# Patient Record
Sex: FEMALE | Race: WHITE | NOT HISPANIC OR LATINO | Employment: STUDENT | URBAN - METROPOLITAN AREA
[De-identification: names, ages, dates, MRNs, and addresses within clinical notes are randomized per-mention and may not be internally consistent; named-entity substitution may affect disease eponyms.]

---
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
## 2022-05-23 ENCOUNTER — ANNUAL EXAM (OUTPATIENT)
Dept: OBGYN CLINIC | Facility: CLINIC | Age: 22
End: 2022-05-23
Payer: COMMERCIAL

## 2022-05-23 VITALS
BODY MASS INDEX: 24.41 KG/M2 | WEIGHT: 143 LBS | HEIGHT: 64 IN | DIASTOLIC BLOOD PRESSURE: 62 MMHG | SYSTOLIC BLOOD PRESSURE: 118 MMHG

## 2022-05-23 DIAGNOSIS — Z01.419 ENCOUNTER FOR ANNUAL ROUTINE GYNECOLOGICAL EXAMINATION: Primary | ICD-10-CM

## 2022-05-23 DIAGNOSIS — N91.4 SECONDARY OLIGOMENORRHEA: ICD-10-CM

## 2022-05-23 PROCEDURE — 99395 PREV VISIT EST AGE 18-39: CPT | Performed by: OBSTETRICS & GYNECOLOGY

## 2022-05-23 NOTE — PROGRESS NOTES
Assessment/Plan:    H/o irregular menses - reviewed her labs and US again  She will continue to observe her cycles  She is aware to call if becomes irregular again or if she skips cycles  She is aware to use condoms if she becomes sexually active  No problem-specific Assessment & Plan notes found for this encounter  There are no diagnoses linked to this encounter  Subjective:      Patient ID: Dima Millan is a 25 y o  female  Pt here for follow up  She was skipping cycles but lately, they have been regular  They are occurring monthly, they only last 2-3 days  Flow is not heavy  She has had no skipped cycles, no cramps  She is not sexually active  The following portions of the patient's history were reviewed and updated as appropriate: allergies, current medications, past family history, past medical history, past social history, past surgical history and problem list     Review of Systems   Constitutional: Negative  Gastrointestinal: Negative  Genitourinary: Negative  Objective:      /62   Ht 5' 4" (1 626 m)   Wt 64 9 kg (143 lb)   LMP 05/09/2022 (Approximate)   BMI 24 55 kg/m²          Physical Exam  Constitutional:       Appearance: Normal appearance  Neck:      Thyroid: No thyromegaly  Cardiovascular:      Rate and Rhythm: Normal rate and regular rhythm  Pulmonary:      Effort: Pulmonary effort is normal       Breath sounds: Normal breath sounds  Abdominal:      General: Abdomen is flat  Palpations: Abdomen is soft  Neurological:      Mental Status: She is alert

## 2022-11-11 ENCOUNTER — TELEPHONE (OUTPATIENT)
Dept: GYNECOLOGY | Facility: CLINIC | Age: 22
End: 2022-11-11

## 2023-06-12 NOTE — PROGRESS NOTES
Assessment/Plan:    She does not require a Pap    Her breast exam is normal in the seated and supine position  No sign of an indentation, mass or tenderness  She will call if she notices any changes  Discussed self breast exams    Menstrual cycles are regular  She keeps track of them on an bonnie and will continue to do so  She is aware to call with any changes  discussed preventive care, regular exercise and a healthy diet      No problem-specific Assessment & Plan notes found for this encounter  There are no diagnoses linked to this encounter  Subjective:      Patient ID: Sera Naranjo is a 23 y o  female  Patient here for yearly  She has no GYN complaints  Her menstrual cycles have been regular for the past 2 years now  She only bleeds for 3 days, her flow is fairly light  She has minimal cramps  She has never been sexually active  This past year at school, she noticed an indentation under her right breast and she was having some intermittent pain  She had an ultrasound and she states that it was normal   She does not really continue to notice this area  The following portions of the patient's history were reviewed and updated as appropriate: allergies, current medications, past family history, past medical history, past social history, past surgical history and problem list     Review of Systems   Constitutional: Negative  Gastrointestinal: Negative  Genitourinary: Negative  Objective: There were no vitals taken for this visit  Physical Exam  Vitals reviewed  Constitutional:       Appearance: She is well-developed  Neck:      Thyroid: No thyromegaly  Trachea: No tracheal deviation  Cardiovascular:      Rate and Rhythm: Normal rate and regular rhythm  Pulmonary:      Effort: Pulmonary effort is normal       Breath sounds: Normal breath sounds     Chest:   Breasts:     Breasts are symmetrical       Right: No inverted nipple, mass, nipple discharge, skin change or tenderness  Left: No inverted nipple, mass, nipple discharge, skin change or tenderness  Abdominal:      General: There is no distension  Palpations: Abdomen is soft  There is no mass  Tenderness: There is no abdominal tenderness     Genitourinary:     Comments: Pelvic exam deferred

## 2023-06-13 ENCOUNTER — ANNUAL EXAM (OUTPATIENT)
Dept: GYNECOLOGY | Facility: CLINIC | Age: 23
End: 2023-06-13
Payer: COMMERCIAL

## 2023-06-13 VITALS
SYSTOLIC BLOOD PRESSURE: 110 MMHG | DIASTOLIC BLOOD PRESSURE: 72 MMHG | WEIGHT: 142 LBS | HEIGHT: 65 IN | BODY MASS INDEX: 23.66 KG/M2

## 2023-06-13 DIAGNOSIS — Z01.419 ENCOUNTER FOR ANNUAL ROUTINE GYNECOLOGICAL EXAMINATION: Primary | ICD-10-CM

## 2023-06-13 PROCEDURE — 99395 PREV VISIT EST AGE 18-39: CPT | Performed by: OBSTETRICS & GYNECOLOGY

## 2023-06-13 RX ORDER — FLUTICASONE PROPIONATE 50 MCG
1 SPRAY, SUSPENSION (ML) NASAL
COMMUNITY

## 2023-06-13 RX ORDER — BECLOMETHASONE DIPROPIONATE HFA 80 UG/1
AEROSOL, METERED RESPIRATORY (INHALATION)
COMMUNITY
Start: 2023-03-28

## 2023-06-13 RX ORDER — ALBUTEROL SULFATE 90 UG/1
POWDER, METERED RESPIRATORY (INHALATION)
COMMUNITY
Start: 2023-03-27

## 2024-02-21 PROBLEM — H10.13 ALLERGIC CONJUNCTIVITIS OF BOTH EYES: Status: RESOLVED | Noted: 2020-01-25 | Resolved: 2024-02-21

## 2024-06-17 ENCOUNTER — ANNUAL EXAM (OUTPATIENT)
Dept: GYNECOLOGY | Facility: CLINIC | Age: 24
End: 2024-06-17
Payer: COMMERCIAL

## 2024-06-17 VITALS — BODY MASS INDEX: 22.66 KG/M2 | SYSTOLIC BLOOD PRESSURE: 106 MMHG | DIASTOLIC BLOOD PRESSURE: 64 MMHG | WEIGHT: 136.2 LBS

## 2024-06-17 DIAGNOSIS — N94.0 OVULATION PAIN: ICD-10-CM

## 2024-06-17 DIAGNOSIS — Z01.419 ENCOUNTER FOR ANNUAL ROUTINE GYNECOLOGICAL EXAMINATION: Primary | ICD-10-CM

## 2024-06-17 PROCEDURE — G0145 SCR C/V CYTO,THINLAYER,RESCR: HCPCS | Performed by: OBSTETRICS & GYNECOLOGY

## 2024-06-17 PROCEDURE — 99395 PREV VISIT EST AGE 18-39: CPT | Performed by: OBSTETRICS & GYNECOLOGY

## 2024-06-17 RX ORDER — NORELGESTROMIN AND ETHINYL ESTRADIOL 35; 150 UG/MG; UG/MG
1 PATCH TRANSDERMAL WEEKLY
Qty: 4 PATCH | Refills: 3 | Status: SHIPPED | OUTPATIENT
Start: 2024-06-17

## 2024-06-17 RX ORDER — FLUOXETINE HYDROCHLORIDE 40 MG/1
40 CAPSULE ORAL DAILY
COMMUNITY
Start: 2023-07-12

## 2024-06-17 RX ORDER — NORELGESTROMIN AND ETHINYL ESTRADIOL 35; 150 UG/MG; UG/MG
1 PATCH TRANSDERMAL WEEKLY
Qty: 1 PATCH | Refills: 0 | Status: SHIPPED | OUTPATIENT
Start: 2024-06-17

## 2024-06-17 NOTE — PROGRESS NOTES
Ambulatory Visit  Name: Maddy Adame      : 2000      MRN: [id number]  Encounter Provider: Greer Parikh DO  Encounter Date: 2024   Encounter department: [facility] [place]    Assessment & Plan     Pap with reflex done today    Discussed self breast exams    Pain with ovulation-this occurred for 2 months.  She had not had this prior.  We discussed causes for this.  She may have had an ovarian cyst that ruptured or she could have pain with ovulation as it was cyclic for 2 months in a row.  We did discuss observation versus a birth control pill as this would suppress ovulation.  She is interested in this.    She is not sexually active but I explained that if she is, she must use condoms to prevent infection.    I reviewed common side effects of OCs including nausea, breast tenderness, BTB, HA, bloating, and mood changes.  Risks of blood clot, rare risk of stroke reviewed in detail.  She is actually interested in a contraceptive patch.  I reviewed the instructions in detail.  RTO 3 months for patch check.    Discussed doing labs but we decided against this.    Of note, when we were discussing ovarian cysts, labs and the pain that she had,she started to feel lightheaded.  She states that this sometimes happens.  She laid down and I gave her water and a cold compress and she felt better.    discussed preventive care, regular exercise and a healthy diet       History of Present Illness     Maddy Adame is a 23 y.o. female who presents for yearly.  Her menstrual cycles have been regular.  In the past, we felt that she had PCOS.  She does have some facial hair growth that she has removed.  She does not have acne and her weight is normal.  She had skipped cycles several years ago but for the past few years, they have been very regular.    On , she had severe bilateral pelvic pain and went to the ER.  She had a CT and an ultrasound and they were consistent with a probable  ruptured ovarian cyst as she had a small amount of free fluid.  This resolved and then she had a similar episode the next month.  She feels this is very cyclic and seems to be around the time of ovulation.  For the past 2 months, her menstrual cycles were also more painful.  She bleeds for 3 to 4 days and her flow is light to moderate.  No midcycle bleeding.  When she gets the pain, it is bilateral.    She has never been sexually active.      Review of Systems   Constitutional: Negative.    Gastrointestinal: Negative.    Genitourinary:  Positive for menstrual problem.   Skin:         Facial hair growth       Objective     /64 (BP Location: Right arm, Patient Position: Sitting)   Wt 61.8 kg (136 lb 3.2 oz)   LMP 06/13/2024 (Exact Date)   BMI 22.66 kg/m²     Physical Exam  Vitals and nursing note reviewed. Exam conducted with a chaperone present.   Constitutional:       General: She is not in acute distress.     Appearance: She is well-developed.   HENT:      Head: Normocephalic and atraumatic.   Eyes:      Conjunctiva/sclera: Conjunctivae normal.   Neck:      Thyroid: No thyromegaly.   Cardiovascular:      Rate and Rhythm: Normal rate and regular rhythm.      Heart sounds: No murmur heard.  Pulmonary:      Effort: Pulmonary effort is normal. No respiratory distress.      Breath sounds: Normal breath sounds.   Chest:   Breasts:     Right: Normal.      Left: Normal.      Comments: Examined seated and supine  Abdominal:      Palpations: Abdomen is soft.      Tenderness: There is no abdominal tenderness.   Genitourinary:     Vagina: Normal.      Cervix: Normal.      Uterus: Normal.       Adnexa: Right adnexa normal and left adnexa normal.   Musculoskeletal:         General: No swelling.      Cervical back: Neck supple.   Skin:     General: Skin is warm and dry.      Capillary Refill: Capillary refill takes less than 2 seconds.   Neurological:      Mental Status: She is alert.   Psychiatric:         Mood and  Affect: Mood normal.       Administrative Statements

## 2024-06-20 LAB
LAB AP GYN PRIMARY INTERPRETATION: NORMAL
LAB AP LMP: NORMAL
Lab: NORMAL

## 2024-08-12 ENCOUNTER — OFFICE VISIT (OUTPATIENT)
Dept: GYNECOLOGY | Facility: CLINIC | Age: 24
End: 2024-08-12
Payer: COMMERCIAL

## 2024-08-12 VITALS — SYSTOLIC BLOOD PRESSURE: 102 MMHG | DIASTOLIC BLOOD PRESSURE: 60 MMHG | BODY MASS INDEX: 23.46 KG/M2 | WEIGHT: 141 LBS

## 2024-08-12 DIAGNOSIS — N94.0 OVULATION PAIN: ICD-10-CM

## 2024-08-12 PROCEDURE — 99213 OFFICE O/P EST LOW 20 MIN: CPT | Performed by: OBSTETRICS & GYNECOLOGY

## 2024-08-12 RX ORDER — NORELGESTROMIN AND ETHINYL ESTRADIOL 35; 150 UG/MG; UG/MG
1 PATCH TRANSDERMAL WEEKLY
Qty: 9 PATCH | Refills: 4 | Status: SHIPPED | OUTPATIENT
Start: 2024-08-12

## 2024-08-12 NOTE — PROGRESS NOTES
Assessment/Plan:     Pain with ovulation - she will continue with the patch.  I think the redness is from the adhesive, it does not seem to be an allergic reaction.  She will watch this closely and if she has more redness or any itching, will call.  Would then recommend an switching to a pill or the vaginal ring.  Given that she notices more hair growth, she would be a good candidate for an antiandrogenic pill such as Tri-Lo-Sprintec or Sprintec.  I reviewed all of this with her and she is agreeable.  She will observe and see what happens for the next few months.    Feeling lightheaded or like her blood sugar is low.  I recommended that she discuss this with her family doctor.  Her past few hemoglobin A1c have been abnormal although stable.         There are no diagnoses linked to this encounter.      Subjective:     Patient ID: Maddy Adame is a 23 y.o. female.    Patient here for a contraception follow-up.  At her yearly exam in June, she had been having persistent pain with ovulation.  We discussed suppressing her cycle with birth control pills or some other type of contraception.  She is interested in the patch.  She started this and is here for follow-up.  She just started her second cycle.  We did her follow-up early because she is leaving for school soon.  She went through 1 full cycle of patches.  She still had midcycle pain.  When she took off her third patch, she noticed some redness in the area.  She had no irritation or itching and it was only on the skin right around the patch.  She had no hives and it did not spread out.  She did not noticed this when she remove the first 2 patches.  She just had a withdrawal bleed during her patch free week and just started a new cycle.    For a long time, she has had laser hair removal for dark hair on her face and she feels it is slightly worse.  We have discussed the possibility of PCOS over the years.    She has also noticed some episodes of feeling lightheaded  or like she has low blood sugar a few times a day.  She drinks a great deal of water.  She does work at the [employer] and is quite active and it has been hot summer.    She is not sexually active.        Review of Systems   Constitutional: Negative.    Gastrointestinal: Negative.    Genitourinary: Negative.    Skin:         Hair growth   Neurological:  Positive for light-headedness.         Objective:     Physical Exam  Genitourinary:     Comments: Faint erythema in the area where she had the patch.  No hives or lesions noted, no rash.  Appears to be resolving

## 2024-12-20 DIAGNOSIS — N94.0 OVULATION PAIN: ICD-10-CM

## 2024-12-23 RX ORDER — NORELGESTROMIN AND ETHINYL ESTRADIOL 35; 150 UG/MG; UG/MG
1 PATCH TRANSDERMAL WEEKLY
Qty: 4 PATCH | Refills: 0 | Status: SHIPPED | OUTPATIENT
Start: 2024-12-23

## 2025-01-10 DIAGNOSIS — N94.0 OVULATION PAIN: ICD-10-CM

## 2025-01-10 RX ORDER — NORELGESTROMIN AND ETHINYL ESTRADIOL 35; 150 UG/MG; UG/MG
1 PATCH TRANSDERMAL WEEKLY
Qty: 3 PATCH | Refills: 7 | Status: SHIPPED | OUTPATIENT
Start: 2025-01-10

## 2025-08-18 ENCOUNTER — ANNUAL EXAM (OUTPATIENT)
Dept: GYNECOLOGY | Facility: CLINIC | Age: 25
End: 2025-08-18
Payer: COMMERCIAL

## 2025-08-18 VITALS
BODY MASS INDEX: 24.39 KG/M2 | DIASTOLIC BLOOD PRESSURE: 66 MMHG | HEIGHT: 65 IN | WEIGHT: 146.4 LBS | SYSTOLIC BLOOD PRESSURE: 110 MMHG

## 2025-08-18 DIAGNOSIS — N94.0 OVULATION PAIN: ICD-10-CM

## 2025-08-18 DIAGNOSIS — Z01.419 ENCOUNTER FOR ANNUAL ROUTINE GYNECOLOGICAL EXAMINATION: Primary | ICD-10-CM

## 2025-08-18 DIAGNOSIS — R10.32 LLQ PAIN: ICD-10-CM

## 2025-08-18 PROCEDURE — 99395 PREV VISIT EST AGE 18-39: CPT | Performed by: OBSTETRICS & GYNECOLOGY

## 2025-08-18 RX ORDER — NORELGESTROMIN AND ETHINYL ESTRADIOL 35; 150 UG/MG; UG/MG
1 PATCH TRANSDERMAL WEEKLY
Qty: 9 PATCH | Refills: 4 | Status: SHIPPED | OUTPATIENT
Start: 2025-08-18

## 2025-08-20 ENCOUNTER — TELEPHONE (OUTPATIENT)
Age: 25
End: 2025-08-20

## 2025-08-21 DIAGNOSIS — N94.0 OVULATION PAIN: ICD-10-CM

## 2025-08-22 RX ORDER — NORELGESTROMIN AND ETHINYL ESTRADIOL 35; 150 UG/MG; UG/MG
1 PATCH TRANSDERMAL WEEKLY
Qty: 9 PATCH | Refills: 4 | OUTPATIENT
Start: 2025-08-22